# Patient Record
(demographics unavailable — no encounter records)

---

## 2024-11-25 NOTE — DISCUSSION/SUMMARY
[FreeTextEntry1] : Her problems and my suggestions for her management are summarized below. She was extensively counseled regarding the following issues:  Obesity: Discussed diet and exercise again. Metformin ordered.   Encouraged lab evaluation.   Encouraged therapy and IMELDA.   Will follow up when she is ready.   This consultation was performed via telehealth using video chat with real-time 2-way audio visual technology. Dr. Cruz provided verbal consent to use the application at the time of consultation. She was physically present in her car and I was physically present off site in [place], [place]. No other people were present for or participated in the consultation. At the end of our visit, the patient indicated that her questions were answered, and she seemed satisfied with our discussion. 12 minutes were spent with the patient on video chat with an additional 15 minutes spent for review of records, documentation, and coordination of care.  Please do not hesitate to contact me with any questions.

## 2024-11-25 NOTE — REVIEW OF SYSTEMS
[Sleep Disturbances] : no sleep disturbances [Anxiety] : anxiety [Depression] : depression [Nl] : Neurological

## 2024-11-25 NOTE — HISTORY OF PRESENT ILLNESS
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 She is a 37-year-old G0 LMP 10/30/2024 with regular menses who desires pregnancy in the near future.  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 She has gained weight in recent years and also presents today for a discussion of possible weight loss options and the impact of obesity on pregnancy. Zepbound not covered by her insurance and she does not want to use a compounding pharmacy. She has started Berberine and bought a scale since our last visit.    She has a lot going on right now and feels overwhelmed. Asked about safety of SSRIs or other anti-anxiety meds in pregnancy. She is not currently taking any anti-anxiety medications.

## 2025-02-11 NOTE — ASSESSMENT
[FreeTextEntry1] : 37 y.o. female with h/o obesity, hirsutism and hyperlipidemia.  1. Obesity- Discussed pathophysiology and risks associated with obesity. Encouraged a carbohydrate consistent diet with portion control and exercise. Hba1c is 5.5% in October 2024. Will increase Metformin ER to 1,000 mg daily. She prefers to try a GLP-1 RA to assist with weight loss prior to proceeding for pregnancy. Patient is euthyroid. Will evaluate for Cushings disease and will check a midnight salivary cortisol level. She is interested in proceeding with treatment with Zepbound. Will consider enrolling in the [employer] Direct Program. Reviewed risks and benefits of GLP-1 Nicky.   2. Hirsutism- Discussed pathophysiology. Had normal DHEAS and testosterone levels. Discussed treatment option of Spironolactone. However, would avoid now since interested in pregnancy. She is considering laser hair removal.   3. Hyperlipidemia- Encouraged a low-fat diet and exercise.   Will follow up in 3 to 4 months.

## 2025-02-11 NOTE — HISTORY OF PRESENT ILLNESS
[FreeTextEntry1] : 37 y.o. female with h/o obesity since childhood presents for follow up.  She is interested in pregnancy.   Diet: met with RD in summer of 2024 Breakfast: Greek yogurt, granola and berries, avocado toast Lunch: salad, chicken Dinner: eats out 1 to 2 times per week- rare fast food Snacks: late night- protein bar, pretzels, cookies Drinks: water, coffee (no sugar but uses Stevia)- diet "[employer]",   She joined [employer] in June 2024 but mainly goes on the weekend given her busy work schedule- does swimming  Has herniated disc which limits physical activity  She reports a lot of stress and increase in anxiety.   Saw GI for IBS and did colonoscopy in March 2023  Menses are regular Does have hirsutism on chin Interested in pregnancy with no prior pregnancy She did see MFM in October 2024.   She started Metformin  mg daily and tolerating.  She is taking MVI daily.  MFM prescribed Zepbound but not covered by her insurance  Works as pediatric cardiologist.  Her mother has prediabetes.  No family history of thyroid disease,   Planning to start working with .  Stress eating and snacking.

## 2025-02-11 NOTE — REVIEW OF SYSTEMS
[Fatigue] : no fatigue [Recent Weight Gain (___ Lbs)] : no recent weight gain [Recent Weight Loss (___ Lbs)] : no recent weight loss [Irregular Menses] : regular menses [Hair Loss] : no hair loss [Anxiety] : anxiety [Polydipsia] : no polydipsia [Cold Intolerance] : no cold intolerance [FreeTextEntry7] : h/o IBS [FreeTextEntry9] : joint pains on and off

## 2025-02-11 NOTE — HISTORY OF PRESENT ILLNESS
[FreeTextEntry1] : 37 y.o. female with h/o obesity since childhood presents for follow up.  She is interested in pregnancy.   Diet: met with RD in summer of 2024 Breakfast: Greek yogurt, granola and berries, avocado toast Lunch: salad, chicken Dinner: eats out 1 to 2 times per week- rare fast food Snacks: late night- protein bar, pretzels, cookies Drinks: water, coffee (no sugar but uses Stevia)- diet [employer],   She joined [employer] in June 2024 but mainly goes on the weekend given her busy work schedule- does swimming  Has herniated disc which limits physical activity  She reports a lot of stress and increase in anxiety.   Saw GI for IBS and did colonoscopy in March 2023  Menses are regular Does have hirsutism on chin Interested in pregnancy with no prior pregnancy She did see MFM in October 2024.   She started Metformin  mg daily and tolerating.  She is taking MVI daily.  MFM prescribed Zepbound but not covered by her insurance  Works as pediatric cardiologist.  Her mother has prediabetes.  No family history of thyroid disease,   Planning to start working with .  Stress eating and snacking.

## 2025-02-11 NOTE — ASSESSMENT
[FreeTextEntry1] : 37 y.o. female with h/o obesity, hirsutism and hyperlipidemia.  1. Obesity- Discussed pathophysiology and risks associated with obesity. Encouraged a carbohydrate consistent diet with portion control and exercise. Hba1c is 5.5% in October 2024. Will increase Metformin ER to 1,000 mg daily. She prefers to try a GLP-1 RA to assist with weight loss prior to proceeding for pregnancy. Patient is euthyroid. Will evaluate for Cushings disease and will check a midnight salivary cortisol level. She is interested in proceeding with treatment with Zepbound. Will consider enrolling in the [facility] Direct Program. Reviewed risks and benefits of GLP-1 Nicky.   2. Hirsutism- Discussed pathophysiology. Had normal DHEAS and testosterone levels. Discussed treatment option of Spironolactone. However, would avoid now since interested in pregnancy. She is considering laser hair removal.   3. Hyperlipidemia- Encouraged a low-fat diet and exercise.   Will follow up in 3 to 4 months.